# Patient Record
Sex: MALE | Race: WHITE
[De-identification: names, ages, dates, MRNs, and addresses within clinical notes are randomized per-mention and may not be internally consistent; named-entity substitution may affect disease eponyms.]

---

## 2018-05-20 ENCOUNTER — HOSPITAL ENCOUNTER (EMERGENCY)
Dept: HOSPITAL 7 - FB.ED | Age: 53
Discharge: NURSING, REHAB OR LONG-TERM CARE | End: 2018-05-20
Payer: COMMERCIAL

## 2018-05-20 VITALS — DIASTOLIC BLOOD PRESSURE: 78 MMHG | SYSTOLIC BLOOD PRESSURE: 130 MMHG

## 2018-05-20 DIAGNOSIS — I48.91: Primary | ICD-10-CM

## 2018-05-20 DIAGNOSIS — Z88.0: ICD-10-CM

## 2018-05-20 DIAGNOSIS — Z79.84: ICD-10-CM

## 2018-05-20 DIAGNOSIS — E11.9: ICD-10-CM

## 2018-05-20 DIAGNOSIS — Z79.899: ICD-10-CM

## 2018-05-20 DIAGNOSIS — Z79.82: ICD-10-CM

## 2018-05-20 DIAGNOSIS — R79.89: ICD-10-CM

## 2018-05-20 DIAGNOSIS — E78.00: ICD-10-CM

## 2018-05-20 DIAGNOSIS — E66.9: ICD-10-CM

## 2018-05-20 DIAGNOSIS — D58.2: ICD-10-CM

## 2018-05-20 DIAGNOSIS — Z88.5: ICD-10-CM

## 2018-05-20 PROCEDURE — 83880 ASSAY OF NATRIURETIC PEPTIDE: CPT

## 2018-05-20 PROCEDURE — 93005 ELECTROCARDIOGRAM TRACING: CPT

## 2018-05-20 PROCEDURE — 96376 TX/PRO/DX INJ SAME DRUG ADON: CPT

## 2018-05-20 PROCEDURE — 71046 X-RAY EXAM CHEST 2 VIEWS: CPT

## 2018-05-20 PROCEDURE — 99285 EMERGENCY DEPT VISIT HI MDM: CPT

## 2018-05-20 PROCEDURE — 96361 HYDRATE IV INFUSION ADD-ON: CPT

## 2018-05-20 PROCEDURE — 81001 URINALYSIS AUTO W/SCOPE: CPT

## 2018-05-20 PROCEDURE — 96372 THER/PROPH/DIAG INJ SC/IM: CPT

## 2018-05-20 PROCEDURE — 36415 COLL VENOUS BLD VENIPUNCTURE: CPT

## 2018-05-20 PROCEDURE — 84484 ASSAY OF TROPONIN QUANT: CPT

## 2018-05-20 PROCEDURE — 96374 THER/PROPH/DIAG INJ IV PUSH: CPT

## 2018-05-20 PROCEDURE — 85025 COMPLETE CBC W/AUTO DIFF WBC: CPT

## 2018-05-20 PROCEDURE — 80053 COMPREHEN METABOLIC PANEL: CPT

## 2018-05-20 PROCEDURE — 86140 C-REACTIVE PROTEIN: CPT

## 2018-05-20 NOTE — EDM.PDOC
ED HPI GENERAL MEDICAL PROBLEM





- General


Chief Complaint: Cardiovascular Problem


Stated Complaint: GENERAL


Time Seen by Provider: 18 07:00


Source of Information: Reports: Patient


History Limitations: Reports: No Limitations





- History of Present Illness


INITIAL COMMENTS - FREE TEXT/NARRATIVE: 





c/o palpitations x 2h





pt awoke 5 AM with palpitations, no other sxs





took ASA 81 mg and lisinopril 10 mg at home





no f/c/d, no n/v, no cp, no sob





pt had had similar sxs the past 2w, would last 1 min or less, would take a deep 

breath and they would go away





has a h/o of an arrythmia 10y ago, he is not sure of what kind, had an EST at 

[facility] 20y ago and at [facility] 1y ago





PCP Dr Lang, does not have a cardiologist





no cigs, alc, street drugs, occasional pop but not daily, never smoked





has DM





/105 at home





works as  at [employer], 40h/wk on Mon-Fri with no more than 6h of overtime d/

t his heart condition





- Related Data


 Allergies











Allergy/AdvReac Type Severity Reaction Status Date / Time


 


codeine Allergy  Hives Verified 18 07:07


 


Penicillins Allergy  Hives Verified 18 07:07











Home Meds: 


 Home Meds





Aspirin [Ecotrin] 81 mg PO DAILY 17 [History]


Esomeprazole [NexIUM] 40 mg PO ACBREAKFAST 17 [History]


Fenofibrate 160 mg PO DAILY 17 [History]


Simvastatin 20 mg PO DAILY 17 [History]


metFORMIN [Glucophage] 500 mg PO BIDMEALS 17 [History]


traMADol [Ultram] 50 mg PO Q6H PRN #30 tab 17 [Rx]


Lisinopril 10 mg PO DAILY 18 [History]











Past Medical History


Cardiovascular History: Reports: High Cholesterol


Gastrointestinal History: Reports: GERD


Neurological History: Reports: Headaches, Chronic


Endocrine/Metabolic History: Reports: Diabetes, Type II, Obesity/BMI 30+


Other Dermatologic History: HYPERTROPHIC AND ATROPHIC CONDITION OF SKIN





- Infectious Disease History


Infectious Disease History: Reports: Chicken Pox





- Past Surgical History


HEENT Surgical History: Reports: Tonsillectomy


Musculoskeletal Surgical History: Reports: Arthroscopic Knee





Social & Family History





- Family History


Family Medical History: Noncontributory





- Tobacco Use


Smoking Status *Q: Never Smoker


Second Hand Smoke Exposure: No





- Caffeine Use


Caffeine Use: Reports: Coffee, Soda





- Recreational Drug Use


Recreational Drug Use: No





ED ROS GENERAL





- Review of Systems


Review Of Systems: See Below


Constitutional: Reports: No Symptoms


HEENT: Reports: No Symptoms


Respiratory: Reports: No Symptoms


Cardiovascular: Reports: Palpitations.  Denies: Chest Pain


Endocrine: Reports: No Symptoms


GI/Abdominal: Reports: No Symptoms


: Reports: No Symptoms


Musculoskeletal: Reports: No Symptoms


Skin: Reports: No Symptoms


Neurological: Reports: No Symptoms


Psychiatric: Reports: No Symptoms


Hematologic/Lymphatic: Reports: No Symptoms


Immunologic: Reports: No Symptoms





ED EXAM, GENERAL





- Physical Exam


Exam: See Below


Exam Limited By: No Limitations


General Appearance: Alert, WD/WN, No Apparent Distress


Eye Exam: Bilateral Eye: Normal Inspection


Nose: Normal Inspection, Normal Mucosa, No Blood


Throat/Mouth: Normal Inspection, Normal Lips, Normal Teeth, Normal Gums, Normal 

Oropharynx, Normal Voice, No Airway Compromise


Head: Atraumatic, Normocephalic


Neck: Normal Inspection, Supple, Non-Tender, Full Range of Motion


Respiratory/Chest: No Respiratory Distress, Lungs Clear, Normal Breath Sounds, 

No Accessory Muscle Use, Chest Non-Tender


Cardiovascular: No Edema, No Gallop, No JVD, No Murmur, No Rub, Irregularly 

Irregular, Other (quiet precordium)


GI/Abdominal: Soft, Non-Tender, No Distention, No Mass, Pelvis Stable


Back Exam: Normal Inspection, Full Range of Motion


Extremities: Normal Inspection, Normal Range of Motion, Non-Tender, No Pedal 

Edema


Neurological: Alert, Oriented, CN II-XII Intact, Normal Cognition, Normal 

Reflexes, No Motor/Sensory Deficits


Psychiatric: Normal Affect, Normal Mood


Skin Exam: Warm, Dry, Intact, Normal Color, No Rash


Lymphatic: No Adenopathy





Course





- Vital Signs


Last Recorded V/S: 


 Last Vital Signs











Temp  36.9 C   18 06:45


 


Pulse  118 H  18 06:45


 


Resp  18   18 06:45


 


BP  123/80   18 09:03


 


Pulse Ox  100   18 06:45














- Orders/Labs/Meds


Orders: 


 Active Orders 24 hr











 Category Date Time Status


 


 EKG Documentation Completion [RC] ASDIRECTED Care  18 07:32 Active


 


 Chest 2V [CR] Stat Exams  18 07:21 Taken


 


 URINALYSIS W/MICROSCOPIC [UA W/MICROSCOPIC] [URIN] Stat Lab  18 07:35 

Ordered


 


 Sodium Chloride 0.9% [Normal Saline] 1,000 ml Med  18 07:15 Active





 IV ASDIRECTED   


 


 EKG 12 Lead [EK] Routine Ther  18 07:45 Ordered








 Medication Orders





Sodium Chloride (Normal Saline)  1,000 mls @ 999 mls/hr IV ASDIRECTED CHUY


   Last Admin: 18 07:23  Dose: 999 mls/hr








Labs: 


 Laboratory Tests











  18 Range/Units





  07:00 07:00 07:00 


 


WBC  7.7    (4.5-12.0)  X10-3/uL


 


RBC  5.60    (4.30-5.75)  x10(6)uL


 


Hgb  16.2 H    (11.5-15.5)  g/dL


 


Hct  47.6    (30.0-51.3)  %


 


MCV  85.1    (80-96)  fL


 


MCH  28.9    (27.7-33.6)  pg


 


MCHC  34.0    (32.2-35.4)  g/dL


 


RDW  12.5    (11.5-15.5)  %


 


Plt Count  252    (125-369)  X10(3)uL


 


MPV  8.0    (7.4-10.4)  fL


 


Neut % (Auto)  52.6    (46-82)  %


 


Lymph % (Auto)  34.5    (13-37)  %


 


Mono % (Auto)  8.0    (4-12)  %


 


Eos % (Auto)  4    (1.0-5.0)  %


 


Baso % (Auto)  1    (0-2)  %


 


Neut # (Auto)  4.1    (1.6-8.3)  #


 


Lymph # (Auto)  2.7    (0.6-5.0)  #


 


Mono # (Auto)  0.6    (0.0-1.3)  #


 


Eos # (Auto)  0.3    (0.0-0.8)  #


 


Baso # (Auto)  0.0    (0.0-0.2)  #


 


Sodium   140   (135-145)  mmol/L


 


Potassium   4.1   (3.5-5.3)  mmol/L


 


Chloride   103   (100-110)  mmol/L


 


Carbon Dioxide   27   (21-32)  mmol/L


 


BUN   24 H   (7-18)  mg/dL


 


Creatinine   1.0   (0.70-1.30)  mg/dL


 


Est Cr Clr Drug Dosing   TNP   


 


Estimated GFR (MDRD)   > 60   (>60)  


 


BUN/Creatinine Ratio   24.0 H   (9-20)  


 


Glucose   132 H   ()  mg/dL


 


Calcium   9.3   (8.6-10.2)  mg/dL


 


Total Bilirubin   0.7   (0.1-1.3)  mg/dL


 


AST   25   (5-25)  IU/L


 


ALT   24   (12-36)  U/L


 


Alkaline Phosphatase   43 L   ()  IU/L


 


Troponin I    < 0.017 L  (<0.017-0.056)  ng/mL


 


C-Reactive Protein     (0.5-0.9)  mg/dL


 


NT-Pro-B Natriuret Pep     (<=125)  pg/mL


 


Total Protein   7.0   (6.0-8.0)  g/dL


 


Albumin   4.1   (3.5-5.2)  g/dL


 


Globulin   2.9   g/dL


 


Albumin/Globulin Ratio   1.4   


 


Urine Color     (YELLOW)  


 


Urine Appearance     (CLEAR)  


 


Urine pH     (5.0-6.5)  


 


Ur Specific Gravity     (1.010-1.025)  


 


Urine Protein     (NEGATIVE)  mg/dL


 


Urine Glucose (UA)     (NEGATIVE)  mg/dL


 


Urine Ketones     (NEGATIVE)  mg/dL


 


Urine Occult Blood     (NEGATIVE)  


 


Urine Nitrite     (NEGATIVE)  


 


Urine Bilirubin     (NEGATIVE)  


 


Urine Urobilinogen     (NEGATIVE)  mg/dL


 


Ur Leukocyte Esterase     (NEGATIVE)  


 


Urine WBC     (0)  


 


Ur Squamous Epith Cells     (NS,R,O)  


 


Urine Bacteria     (NS)  














  18 Range/Units





  07:00 07:00 07:35 


 


WBC     (4.5-12.0)  X10-3/uL


 


RBC     (4.30-5.75)  x10(6)uL


 


Hgb     (11.5-15.5)  g/dL


 


Hct     (30.0-51.3)  %


 


MCV     (80-96)  fL


 


MCH     (27.7-33.6)  pg


 


MCHC     (32.2-35.4)  g/dL


 


RDW     (11.5-15.5)  %


 


Plt Count     (125-369)  X10(3)uL


 


MPV     (7.4-10.4)  fL


 


Neut % (Auto)     (46-82)  %


 


Lymph % (Auto)     (13-37)  %


 


Mono % (Auto)     (4-12)  %


 


Eos % (Auto)     (1.0-5.0)  %


 


Baso % (Auto)     (0-2)  %


 


Neut # (Auto)     (1.6-8.3)  #


 


Lymph # (Auto)     (0.6-5.0)  #


 


Mono # (Auto)     (0.0-1.3)  #


 


Eos # (Auto)     (0.0-0.8)  #


 


Baso # (Auto)     (0.0-0.2)  #


 


Sodium     (135-145)  mmol/L


 


Potassium     (3.5-5.3)  mmol/L


 


Chloride     (100-110)  mmol/L


 


Carbon Dioxide     (21-32)  mmol/L


 


BUN     (7-18)  mg/dL


 


Creatinine     (0.70-1.30)  mg/dL


 


Est Cr Clr Drug Dosing     


 


Estimated GFR (MDRD)     (>60)  


 


BUN/Creatinine Ratio     (9-20)  


 


Glucose     ()  mg/dL


 


Calcium     (8.6-10.2)  mg/dL


 


Total Bilirubin     (0.1-1.3)  mg/dL


 


AST     (5-25)  IU/L


 


ALT     (12-36)  U/L


 


Alkaline Phosphatase     ()  IU/L


 


Troponin I     (<0.017-0.056)  ng/mL


 


C-Reactive Protein  < 0.2 L    (0.5-0.9)  mg/dL


 


NT-Pro-B Natriuret Pep   86   (<=125)  pg/mL


 


Total Protein     (6.0-8.0)  g/dL


 


Albumin     (3.5-5.2)  g/dL


 


Globulin     g/dL


 


Albumin/Globulin Ratio     


 


Urine Color    Yellow  (YELLOW)  


 


Urine Appearance    Clear  (CLEAR)  


 


Urine pH    6.0  (5.0-6.5)  


 


Ur Specific Gravity    1.015  (1.010-1.025)  


 


Urine Protein    Negative  (NEGATIVE)  mg/dL


 


Urine Glucose (UA)    Normal  (NEGATIVE)  mg/dL


 


Urine Ketones    Negative  (NEGATIVE)  mg/dL


 


Urine Occult Blood    Negative  (NEGATIVE)  


 


Urine Nitrite    Negative  (NEGATIVE)  


 


Urine Bilirubin    Negative  (NEGATIVE)  


 


Urine Urobilinogen    Normal  (NEGATIVE)  mg/dL


 


Ur Leukocyte Esterase    Negative  (NEGATIVE)  


 


Urine WBC    0-5  (0)  


 


Ur Squamous Epith Cells    Occasional  (NS,R,O)  


 


Urine Bacteria    Few H  (NS)  











Meds: 


Medications











Generic Name Dose Route Start Last Admin





  Trade Name Freq  PRN Reason Stop Dose Admin


 


Sodium Chloride  1,000 mls @ 999 mls/hr  18 07:15  18 07:23





  Normal Saline  IV   999 mls/hr





  ASDIRECTED CHUY   Administration





     





     





     





     














Discontinued Medications














Generic Name Dose Route Start Last Admin





  Trade Name Freq  PRN Reason Stop Dose Admin


 


Aspirin  263 mg  18 07:18  18 07:34





  Aspirin  PO  18 07:19  Not Given





  ONETIME ONE   





     





     





     





     


 


Aspirin  243 mg  18 07:31  18 07:38





  Aspirin  PO  18 07:32  243 mg





  ONETIME ONE   Administration





     





     





     





     


 


Diltiazem HCl  20 mg  18 06:49  18 08:10





  Diltiazem  IVPUSH  18 06:50  20 mg





  ONETIME ONE   Administration





     





     





     





     


 


Diltiazem HCl  20 mg  18 08:32  18 09:03





  Diltiazem  IVPUSH  18 08:33  10 mg





  ONETIME ONE   Administration





     





     





     





     


 


Diltiazem HCl  10 mg  18 08:36  18 09:07





  Cardizem  IVPUSH  18 08:37  Not Given





  ONETIME ONE   





     





     





     





     














- Re-Assessments/Exams


Free Text/Narrative Re-Assessment/Exam: 





18 09:09


BNP and trop neg, CxR neg, BUN 24 and hgb 16 c/w prerenal azotemia and possible 

dehydration altho SG 1.015 and no ketones in urine





pt reports "strong family history" of heart issues, MGM  55 from [place], uncle 

is 60 and just had MI, father has heart issues





call placed to [facility], pt agrees to transfer, pt accepted by Dr Salas hospitalist





Departure





- Departure


Time of Disposition: 09:22


Disposition: DC/Tfer to [facility]


Reason for Transfer *Q: Other


Condition: Good


Clinical Impression: 


 Elevated hemoglobin, Prerenal azotemia, FH: cardiovascular disease, Diabetes 

mellitus, Hyperlipidemia, Intermittent palpitations





Atrial fibrillation


Qualifiers:


 Atrial fibrillation type: unspecified Qualified Code(s): I48.91 - Unspecified 

atrial fibrillation





Referrals: 


Kimani Love MD [Primary Care Provider] - 


Forms:  ED Department Discharge





- My Orders


Last 24 Hours: 


My Active Orders





18 07:15


Sodium Chloride 0.9% [Normal Saline] 1,000 ml IV ASDIRECTED 





18 07:21


Chest 2V [CR] Stat 





18 07:35


URINALYSIS W/MICROSCOPIC [UA W/MICROSCOPIC] [URIN] Stat 














- Assessment/Plan


Last 24 Hours: 


My Active Orders





18 07:15


Sodium Chloride 0.9% [Normal Saline] 1,000 ml IV ASDIRECTED 





18 07:21


Chest 2V [CR] Stat 





18 07:35


URINALYSIS W/MICROSCOPIC [UA W/MICROSCOPIC] [URIN] Stat

## 2018-05-21 NOTE — CR
INDICATION:  Atrial fibrillation.



CHEST:  Two PA and two lateral views of the chest were obtained 05/20/2018 and 
compared with 07/01/2008, portable.  



Overlying EKG leads are noted. 



Heart, mediastinum, and bony thorax were unremarkable.



An active infiltrate or effusion was not identified.  



IMPRESSION:  No acute process.  Stable chest.

MTDD